# Patient Record
Sex: FEMALE | Race: WHITE
[De-identification: names, ages, dates, MRNs, and addresses within clinical notes are randomized per-mention and may not be internally consistent; named-entity substitution may affect disease eponyms.]

---

## 2019-04-02 ENCOUNTER — HOSPITAL ENCOUNTER (EMERGENCY)
Dept: HOSPITAL 25 - UCCORT | Age: 24
Discharge: HOME | End: 2019-04-02
Payer: COMMERCIAL

## 2019-04-02 VITALS — SYSTOLIC BLOOD PRESSURE: 121 MMHG | DIASTOLIC BLOOD PRESSURE: 75 MMHG

## 2019-04-02 DIAGNOSIS — Z88.1: ICD-10-CM

## 2019-04-02 DIAGNOSIS — R39.15: ICD-10-CM

## 2019-04-02 DIAGNOSIS — N39.0: Primary | ICD-10-CM

## 2019-04-02 DIAGNOSIS — R35.0: ICD-10-CM

## 2019-04-02 PROCEDURE — 87077 CULTURE AEROBIC IDENTIFY: CPT

## 2019-04-02 PROCEDURE — 81003 URINALYSIS AUTO W/O SCOPE: CPT

## 2019-04-02 PROCEDURE — 87086 URINE CULTURE/COLONY COUNT: CPT

## 2019-04-02 PROCEDURE — G0463 HOSPITAL OUTPT CLINIC VISIT: HCPCS

## 2019-04-02 PROCEDURE — 99202 OFFICE O/P NEW SF 15 MIN: CPT

## 2019-04-02 PROCEDURE — 87186 SC STD MICRODIL/AGAR DIL: CPT

## 2019-04-02 NOTE — UC
Complaint Female HPI





- HPI Summary


HPI Summary: 





dysuria x 1 day 


burning with urination , urinary frequency, urgency 


no fever, no chills, no flank pain 





- History Of Current Complaint


Chief Complaint: UCGU


Stated Complaint: URINARY COMPLAINT


Time Seen by Provider: 04/02/19 10:09


Hx Obtained From: Patient


Onset/Duration: Gradual Onset, Lasting Days - 1, Still Present


Timing: Constant


Severity Initially: Moderate


Severity Currently: Moderate


Pain Intensity: 6


Character: Burning


Aggravating Factor(s): Urination


Associated Signs And Symptoms: Negative: Fever, Back Pain, Vaginal Bleeding/

Discharge, Vaginal Discharge, Nausea, Vomiting(# Of Episodes =), Genital 

Swelling, Genital Blisters, Retained Foregin Body (Specify)





- Allergies/Home Medications


Allergies/Adverse Reactions: 


 Allergies











Allergy/AdvReac Type Severity Reaction Status Date / Time


 


amoxicillin Allergy  Rash Verified 04/02/19 09:53











Home Medications: 


 Home Medications





Azo Urinary Relief 1 dose PO ONCE 04/02/19 [History Confirmed 04/02/19]


Fluconazole 150 MG TAB* [Diflucan 150 MG TAB*] 150 mg PO ONCE 04/02/19 [History 

Confirmed 04/02/19]


Norethindrone-E.estradiol-Iron [Junel Fe 1 mg-20 Mcg Tablet] 1 each PO DAILY 04/ 02/19 [History Confirmed 04/02/19]











PMH/Surg Hx/FS Hx/Imm Hx


Previously Healthy: Yes





- Surgical History


Surgical History: Yes


Surgery Procedure, Year, and Place: T&A





- Family History


Known Family History: 


   Negative: Diabetes





- Social History


Alcohol Use: Occasionally


Substance Use Type: None


Smoking Status (MU): Never Smoked Tobacco





Review of Systems


All Other Systems Reviewed And Are Negative: Yes


Constitutional: Positive: Negative


Skin: Positive: Negative


Eyes: Positive: Negative


ENT: Positive: Negative


Respiratory: Positive: Negative


Gastrointestinal: Positive: Negative


Genitourinary: Positive: Dysuria, Hematuria, Frequency, Urgency.  Negative: 

Vaginal/Penile Burning, Vaginal/Penile Itching, Vaginal/Penile Discharge, 

Vaginal/Penile Pain, Vaginal/Penile Tenderness, Ulceration/Lesion, Abnormal 

Bleeding





Physical Exam


Triage Information Reviewed: Yes


Appearance: Well-Appearing, No Pain Distress, Well-Nourished


Vital Signs: 


 Initial Vital Signs











Temp  98.5 F   04/02/19 09:54


 


Pulse  81   04/02/19 09:54


 


Resp  15   04/02/19 09:54


 


BP  121/75   04/02/19 09:54


 


Pulse Ox  100   04/02/19 09:54











Vital Signs Reviewed: Yes


Eye Exam: Normal


Eyes: Positive: Conjunctiva Clear


ENT: Positive: Normal ENT inspection, Hearing grossly normal, Pharynx normal


Neck: Positive: Supple, Nontender, No Lymphadenopathy


Respiratory: Positive: Chest non-tender, Lungs clear, Normal breath sounds, No 

respiratory distress


Cardiovascular: Positive: RRR, No Murmur, Pulses Normal


Abdominal Exam: Normal


Abdomen Description: Positive: Nontender, Soft.  Negative: CVA Tenderness (R), 

CVA Tenderness (L), Distended, Guarding


Bowel Sounds: Positive: Present





 Complaint Female Dx





- Differential Dx/Diagnosis


Provider Diagnosis: 


 UTI (urinary tract infection)








Discharge





- Sign-Out/Discharge


Documenting (check all that apply): Patient Departure


All imaging exams completed and their final reports reviewed: No Studies





- Discharge Plan


Condition: Stable


Disposition: HOME


Prescriptions: 


Sulfamethox/Trimethoprim DS* [Bactrim /160 TAB*] 1 tab PO BID #10 tab


Patient Education Materials:  Urinary Tract Infection in Women (DC)


Referrals: 


No Primary Care Phys,NOPCP [Primary Care Provider] - If Needed





- Billing Disposition and Condition


Condition: STABLE


Disposition: Home